# Patient Record
Sex: FEMALE | Race: OTHER | URBAN - METROPOLITAN AREA
[De-identification: names, ages, dates, MRNs, and addresses within clinical notes are randomized per-mention and may not be internally consistent; named-entity substitution may affect disease eponyms.]

---

## 2018-11-19 ENCOUNTER — IMPORTED ENCOUNTER (OUTPATIENT)
Dept: URBAN - METROPOLITAN AREA CLINIC 9 | Facility: CLINIC | Age: 68
End: 2018-11-19

## 2018-12-17 ENCOUNTER — IMPORTED ENCOUNTER (OUTPATIENT)
Dept: URBAN - METROPOLITAN AREA CLINIC 9 | Facility: CLINIC | Age: 68
End: 2018-12-17

## 2019-03-18 ENCOUNTER — IMPORTED ENCOUNTER (OUTPATIENT)
Dept: URBAN - METROPOLITAN AREA CLINIC 9 | Facility: CLINIC | Age: 69
End: 2019-03-18

## 2019-06-24 ENCOUNTER — IMPORTED ENCOUNTER (OUTPATIENT)
Dept: URBAN - METROPOLITAN AREA CLINIC 9 | Facility: CLINIC | Age: 69
End: 2019-06-24

## 2020-01-06 ENCOUNTER — IMPORTED ENCOUNTER (OUTPATIENT)
Dept: URBAN - METROPOLITAN AREA CLINIC 9 | Facility: CLINIC | Age: 70
End: 2020-01-06

## 2020-09-17 ENCOUNTER — IMPORTED ENCOUNTER (OUTPATIENT)
Dept: URBAN - METROPOLITAN AREA CLINIC 9 | Facility: CLINIC | Age: 70
End: 2020-09-17

## 2021-03-15 ENCOUNTER — IMPORTED ENCOUNTER (OUTPATIENT)
Dept: URBAN - METROPOLITAN AREA CLINIC 9 | Facility: CLINIC | Age: 71
End: 2021-03-15

## 2021-04-27 ENCOUNTER — IMPORTED ENCOUNTER (OUTPATIENT)
Dept: URBAN - METROPOLITAN AREA CLINIC 9 | Facility: CLINIC | Age: 71
End: 2021-04-27

## 2021-04-27 PROBLEM — H35.033: Noted: 2021-04-27

## 2021-04-27 PROBLEM — H26.493: Noted: 2021-04-27

## 2021-10-16 ASSESSMENT — TONOMETRY
OD_IOP_MMHG: 16
OD_IOP_MMHG: 13
OS_IOP_MMHG: 14
OS_IOP_MMHG: 14
OS_IOP_MMHG: 13
OS_IOP_MMHG: 16
OD_IOP_MMHG: 15
OD_IOP_MMHG: 17
OD_IOP_MMHG: 16
OS_IOP_MMHG: 12
OD_IOP_MMHG: 17
OS_IOP_MMHG: 14
OD_IOP_MMHG: 17
OS_IOP_MMHG: 13
OS_IOP_MMHG: 13
OD_IOP_MMHG: 11

## 2021-10-16 ASSESSMENT — VISUAL ACUITY
OS_CC: 20/20 SN
OS_CC: 20/30 -2 SN
OD_CC: 20/25 -2 SN
OS_CC: 20/25 SN
OS_CC: 20/30 SN
OD_CC: 20/25 SN
OD_CC: 20/40 +2 SN
OD_CC: 20/25 - SN
OS_CC: 20/20 SN
OS_SC: 20/25 SN
OS_CC: 20/30 SN
OD_CC: 20/25 SN
OD_SC: 20/30 -2 SN
OS_CC: 20/25 + SN
OD_CC: 20/25 SN
OS_CC: 20/20 SN
OD_CC: 20/25 SN
OD_CC: 20/25 - SN

## 2022-01-24 ENCOUNTER — ESTABLISHED PATIENT (OUTPATIENT)
Dept: URBAN - METROPOLITAN AREA CLINIC 14 | Facility: CLINIC | Age: 72
End: 2022-01-24

## 2022-01-24 DIAGNOSIS — H35.033: ICD-10-CM

## 2022-01-24 DIAGNOSIS — H35.3132: ICD-10-CM

## 2022-01-24 DIAGNOSIS — H43.813: ICD-10-CM

## 2022-01-24 PROCEDURE — 92014 COMPRE OPH EXAM EST PT 1/>: CPT

## 2022-01-24 PROCEDURE — 92134 CPTRZ OPH DX IMG PST SGM RTA: CPT

## 2022-01-24 ASSESSMENT — TONOMETRY
OD_IOP_MMHG: 17
OS_IOP_MMHG: 14

## 2022-01-24 ASSESSMENT — VISUAL ACUITY
OS_SC: 20/20
OD_SC: 20/25-1

## 2022-06-18 RX ORDER — ATENOLOL 25 MG/1
TABLET ORAL
COMMUNITY

## 2022-06-18 RX ORDER — HYDROCHLOROTHIAZIDE 25 MG/1
TABLET ORAL
COMMUNITY

## 2022-06-18 RX ORDER — LEVOTHYROXINE SODIUM 0.07 MG/1
TABLET ORAL
COMMUNITY

## 2022-06-18 RX ORDER — CETIRIZINE HYDROCHLORIDE 10 MG/1
TABLET ORAL
COMMUNITY

## 2022-06-18 RX ORDER — RABEPRAZOLE SODIUM 20 MG/1
TABLET, DELAYED RELEASE ORAL
COMMUNITY

## 2022-08-08 ENCOUNTER — ESTABLISHED PATIENT (OUTPATIENT)
Dept: URBAN - METROPOLITAN AREA CLINIC 14 | Facility: CLINIC | Age: 72
End: 2022-08-08

## 2022-08-08 DIAGNOSIS — H43.813: ICD-10-CM

## 2022-08-08 DIAGNOSIS — H35.033: ICD-10-CM

## 2022-08-08 DIAGNOSIS — H35.3132: ICD-10-CM

## 2022-08-08 PROCEDURE — 92014 COMPRE OPH EXAM EST PT 1/>: CPT

## 2022-08-08 PROCEDURE — 92134 CPTRZ OPH DX IMG PST SGM RTA: CPT

## 2022-08-08 ASSESSMENT — TONOMETRY
OS_IOP_MMHG: 12
OD_IOP_MMHG: 10

## 2022-08-08 ASSESSMENT — VISUAL ACUITY
OU_CC: 20/20-1
OD_CC: 20/25
OS_CC: 20/25

## 2022-11-22 NOTE — PROCEDURE NOTE: CLINICAL
PROCEDURE NOTE: Removal of Conjunctival FB, Superficial #1 Right Upper Lid. Anesthesia: Topical. Prep: Antibiotic Drops q 5min x 3. Prior to treatment, the risks/benefits/alternatives were discussed. The patient wished to proceed with procedure. Superficial conjunctival FB removed with forcep/needle. Globe and conjunctiva intact. Patient tolerated procedure well. There were no complications. Post procedure instructions given. Nadine Rivera

## 2023-09-11 ENCOUNTER — ESTABLISHED PATIENT (OUTPATIENT)
Dept: URBAN - METROPOLITAN AREA CLINIC 14 | Facility: CLINIC | Age: 73
End: 2023-09-11

## 2023-09-11 DIAGNOSIS — H35.3132: ICD-10-CM

## 2023-09-11 DIAGNOSIS — H43.813: ICD-10-CM

## 2023-09-11 DIAGNOSIS — H26.493: ICD-10-CM

## 2023-09-11 DIAGNOSIS — H35.033: ICD-10-CM

## 2023-09-11 PROCEDURE — 99214 OFFICE O/P EST MOD 30 MIN: CPT

## 2023-09-11 PROCEDURE — 92134 CPTRZ OPH DX IMG PST SGM RTA: CPT

## 2023-09-11 ASSESSMENT — VISUAL ACUITY
OU_CC: 20/20-1
OS_CC: 20/20-1
OD_CC: 20/30+1

## 2023-09-11 ASSESSMENT — TONOMETRY
OD_IOP_MMHG: 10
OS_IOP_MMHG: 10

## 2024-04-01 ENCOUNTER — ESTABLISHED PATIENT (OUTPATIENT)
Dept: URBAN - METROPOLITAN AREA CLINIC 14 | Facility: CLINIC | Age: 74
End: 2024-04-01

## 2024-04-01 DIAGNOSIS — H35.3132: ICD-10-CM

## 2024-04-01 DIAGNOSIS — H35.033: ICD-10-CM

## 2024-04-01 DIAGNOSIS — H26.493: ICD-10-CM

## 2024-04-01 DIAGNOSIS — H04.123: ICD-10-CM

## 2024-04-01 DIAGNOSIS — H43.813: ICD-10-CM

## 2024-04-01 PROCEDURE — 99214 OFFICE O/P EST MOD 30 MIN: CPT

## 2024-04-01 PROCEDURE — 92250 FUNDUS PHOTOGRAPHY W/I&R: CPT

## 2024-04-01 ASSESSMENT — VISUAL ACUITY
OD_CC: 20/30-2
OS_CC: 20/25

## 2024-04-01 ASSESSMENT — TONOMETRY
OD_IOP_MMHG: 15
OS_IOP_MMHG: 11

## 2024-10-07 ENCOUNTER — COMPREHENSIVE EXAM (OUTPATIENT)
Dept: URBAN - METROPOLITAN AREA CLINIC 14 | Facility: CLINIC | Age: 74
End: 2024-10-07

## 2024-10-07 DIAGNOSIS — H35.3113: ICD-10-CM

## 2024-10-07 DIAGNOSIS — H43.813: ICD-10-CM

## 2024-10-07 DIAGNOSIS — H26.493: ICD-10-CM

## 2024-10-07 DIAGNOSIS — H35.033: ICD-10-CM

## 2024-10-07 DIAGNOSIS — H35.3122: ICD-10-CM

## 2024-10-07 DIAGNOSIS — H04.123: ICD-10-CM

## 2024-10-07 PROCEDURE — 92134 CPTRZ OPH DX IMG PST SGM RTA: CPT

## 2024-10-07 PROCEDURE — 99214 OFFICE O/P EST MOD 30 MIN: CPT

## 2024-11-04 ENCOUNTER — EMERGENCY VISIT (OUTPATIENT)
Dept: URBAN - METROPOLITAN AREA CLINIC 14 | Facility: CLINIC | Age: 74
End: 2024-11-04

## 2024-11-04 DIAGNOSIS — H26.493: ICD-10-CM

## 2024-11-04 DIAGNOSIS — H35.3113: ICD-10-CM

## 2024-11-04 DIAGNOSIS — H35.3122: ICD-10-CM

## 2024-11-04 DIAGNOSIS — H43.813: ICD-10-CM

## 2024-11-04 DIAGNOSIS — H35.033: ICD-10-CM

## 2024-11-04 DIAGNOSIS — H04.123: ICD-10-CM

## 2024-11-04 PROCEDURE — 92134 CPTRZ OPH DX IMG PST SGM RTA: CPT

## 2024-11-04 PROCEDURE — 99214 OFFICE O/P EST MOD 30 MIN: CPT

## 2024-11-18 ENCOUNTER — CLINIC PROCEDURE ONLY (OUTPATIENT)
Dept: URBAN - METROPOLITAN AREA CLINIC 14 | Facility: CLINIC | Age: 74
End: 2024-11-18

## 2024-11-18 DIAGNOSIS — H35.3113: ICD-10-CM

## 2024-11-18 DIAGNOSIS — H35.3122: ICD-10-CM

## 2024-11-18 PROCEDURE — 92134 CPTRZ OPH DX IMG PST SGM RTA: CPT

## 2024-11-18 PROCEDURE — 67028 INJECTION EYE DRUG: CPT

## 2025-01-13 ENCOUNTER — COMPREHENSIVE EXAM (OUTPATIENT)
Age: 75
End: 2025-01-13

## 2025-01-13 DIAGNOSIS — H43.813: ICD-10-CM

## 2025-01-13 DIAGNOSIS — H35.033: ICD-10-CM

## 2025-01-13 DIAGNOSIS — H35.3113: ICD-10-CM

## 2025-01-13 DIAGNOSIS — H35.3122: ICD-10-CM

## 2025-01-13 DIAGNOSIS — H26.493: ICD-10-CM

## 2025-01-13 PROCEDURE — 92134 CPTRZ OPH DX IMG PST SGM RTA: CPT

## 2025-01-13 PROCEDURE — 99214 OFFICE O/P EST MOD 30 MIN: CPT | Mod: 25

## 2025-01-13 PROCEDURE — 67028 INJECTION EYE DRUG: CPT

## 2025-03-10 ENCOUNTER — COMPREHENSIVE EXAM (OUTPATIENT)
Age: 75
End: 2025-03-10

## 2025-03-10 DIAGNOSIS — H35.3122: ICD-10-CM

## 2025-03-10 DIAGNOSIS — H35.3113: ICD-10-CM

## 2025-03-10 PROCEDURE — 67028 INJECTION EYE DRUG: CPT

## 2025-03-10 PROCEDURE — 92134 CPTRZ OPH DX IMG PST SGM RTA: CPT

## 2025-05-05 ENCOUNTER — COMPREHENSIVE EXAM (OUTPATIENT)
Age: 75
End: 2025-05-05

## 2025-05-05 DIAGNOSIS — H35.033: ICD-10-CM

## 2025-05-05 DIAGNOSIS — H43.813: ICD-10-CM

## 2025-05-05 DIAGNOSIS — H35.3113: ICD-10-CM

## 2025-05-05 DIAGNOSIS — H35.3122: ICD-10-CM

## 2025-05-05 DIAGNOSIS — H26.493: ICD-10-CM

## 2025-05-05 PROCEDURE — 92134 CPTRZ OPH DX IMG PST SGM RTA: CPT

## 2025-05-05 PROCEDURE — 67028 INJECTION EYE DRUG: CPT

## 2025-05-05 PROCEDURE — 99214 OFFICE O/P EST MOD 30 MIN: CPT | Mod: 25

## 2025-07-03 ENCOUNTER — CLINIC PROCEDURE ONLY (OUTPATIENT)
Age: 75
End: 2025-07-03

## 2025-07-03 DIAGNOSIS — H35.3122: ICD-10-CM

## 2025-07-03 DIAGNOSIS — H35.3113: ICD-10-CM

## 2025-07-03 PROCEDURE — 67028 INJECTION EYE DRUG: CPT

## 2025-07-03 PROCEDURE — 92134 CPTRZ OPH DX IMG PST SGM RTA: CPT

## 2025-08-28 ENCOUNTER — COMPREHENSIVE EXAM (OUTPATIENT)
Age: 75
End: 2025-08-28

## 2025-08-28 DIAGNOSIS — H43.813: ICD-10-CM

## 2025-08-28 DIAGNOSIS — H35.3113: ICD-10-CM

## 2025-08-28 DIAGNOSIS — H35.033: ICD-10-CM

## 2025-08-28 DIAGNOSIS — H35.3122: ICD-10-CM

## 2025-08-28 DIAGNOSIS — H26.493: ICD-10-CM

## 2025-08-28 PROCEDURE — 67028 INJECTION EYE DRUG: CPT

## 2025-08-28 PROCEDURE — 99214 OFFICE O/P EST MOD 30 MIN: CPT | Mod: 25

## 2025-08-28 PROCEDURE — 92134 CPTRZ OPH DX IMG PST SGM RTA: CPT
